# Patient Record
Sex: MALE | Race: WHITE | NOT HISPANIC OR LATINO | Employment: FULL TIME | URBAN - NONMETROPOLITAN AREA
[De-identification: names, ages, dates, MRNs, and addresses within clinical notes are randomized per-mention and may not be internally consistent; named-entity substitution may affect disease eponyms.]

---

## 2022-10-10 ENCOUNTER — OFFICE VISIT (OUTPATIENT)
Dept: CARDIOLOGY | Facility: CLINIC | Age: 62
End: 2022-10-10

## 2022-10-10 VITALS
DIASTOLIC BLOOD PRESSURE: 72 MMHG | SYSTOLIC BLOOD PRESSURE: 122 MMHG | WEIGHT: 216 LBS | BODY MASS INDEX: 30.92 KG/M2 | HEART RATE: 60 BPM | HEIGHT: 70 IN

## 2022-10-10 DIAGNOSIS — G47.00 FREQUENT NOCTURNAL AWAKENING: ICD-10-CM

## 2022-10-10 DIAGNOSIS — I10 PRIMARY HYPERTENSION: ICD-10-CM

## 2022-10-10 DIAGNOSIS — K21.9 GASTROESOPHAGEAL REFLUX DISEASE, UNSPECIFIED WHETHER ESOPHAGITIS PRESENT: ICD-10-CM

## 2022-10-10 DIAGNOSIS — R53.83 OTHER FATIGUE: ICD-10-CM

## 2022-10-10 DIAGNOSIS — E66.2 CLASS 1 OBESITY WITH ALVEOLAR HYPOVENTILATION AND BODY MASS INDEX (BMI) OF 31.0 TO 31.9 IN ADULT, UNSPECIFIED WHETHER SERIOUS COMORBIDITY PRESENT: ICD-10-CM

## 2022-10-10 DIAGNOSIS — R07.89 OTHER CHEST PAIN: Primary | ICD-10-CM

## 2022-10-10 DIAGNOSIS — R06.02 SHORTNESS OF BREATH: ICD-10-CM

## 2022-10-10 DIAGNOSIS — R35.1 NOCTURIA: ICD-10-CM

## 2022-10-10 PROCEDURE — 99204 OFFICE O/P NEW MOD 45 MIN: CPT | Performed by: NURSE PRACTITIONER

## 2022-10-10 PROCEDURE — 93000 ELECTROCARDIOGRAM COMPLETE: CPT | Performed by: NURSE PRACTITIONER

## 2022-10-10 RX ORDER — ZINC GLUCONATE 50 MG
1 TABLET ORAL DAILY
COMMUNITY

## 2022-10-10 RX ORDER — LOSARTAN POTASSIUM 100 MG/1
100 TABLET ORAL DAILY
COMMUNITY

## 2022-10-10 RX ORDER — OMEPRAZOLE 20 MG/1
20 CAPSULE, DELAYED RELEASE ORAL DAILY
COMMUNITY

## 2022-10-10 RX ORDER — MULTIVIT-MIN/IRON/FOLIC ACID/K 18-600-40
2000 CAPSULE ORAL DAILY
COMMUNITY

## 2022-10-10 RX ORDER — NIFEDIPINE 30 MG/1
30 TABLET, EXTENDED RELEASE ORAL DAILY
COMMUNITY

## 2022-10-10 NOTE — PROGRESS NOTES
Subjective   Jigar Cochran is a 62 y.o. male seen in the office today for initial cardiac evaluation.  About 3 years ago he was experiencing chest discomfort and was found to have hypertension.  He has been treated with medication since that time and done quite well.  Past medical history also includes: GERD, currently controlled with omeprazole and mild COPD.  He has a 20-year smoking history with cessation in . His past medical history is negative for significant headaches, seizures or tremors, thyroid disorder, diabetes, kidney or  disorders, GI, or prior surgical intervention.  Current symptoms include becoming easily short of breath with mild exertion, fatigue and daytime sleepiness, mild swelling in feet and ankles, and mild chest discomfort.  Family history includes mother treated for hypertension.  Father  secondary to emphysema, but was told he had also suffered heart attack in the past.      Chief Complaint   Patient presents with   • Establish Care     Referred per PCP for chest pain on exertion   • Chest Pain     Has chest pain and SOA with exertion .   • Leg Swelling     Has swelling in ankles and feet . Is worse at the end of the day   • LABS     Had labs 2022 . Results in referral packet . Has never had cardiac testing or saw Cardiology before   • Sleep Apnea     Has difficulty staying asleep at night , he does fall asleep easily when he gets still.  He  has never had any testing for sleep apnea .       Initial cardiac evaluation; shortness of breath, chest discomfort, fatigue.        Cardiac History  History reviewed. No pertinent surgical history.    Current Outpatient Medications   Medication Sig Dispense Refill   • ASPIRIN-CAFFEINE PO Take  by mouth Daily.     • Ibuprofen (ADVIL PO) Take 1 tablet by mouth As Needed.     • losartan (COZAAR) 100 MG tablet Take 1 tablet by mouth Daily.     • NIFEdipine XL (PROCARDIA XL) 30 MG 24 hr tablet Take 1 tablet by mouth Daily.     • omeprazole  (priLOSEC) 20 MG capsule Take 1 capsule by mouth Daily.     • Vitamin D, Cholecalciferol, 50 MCG (2000 UT) capsule Take 1 capsule by mouth Daily.     • Zinc 50 MG tablet Take 1 tablet by mouth Daily.       No current facility-administered medications for this visit.       Patient has no known allergies.    Past Medical History:   Diagnosis Date   • GERD (gastroesophageal reflux disease)    • Hyperlipidemia    • Hypertension    • Sleep apnea     Not sure but Dr suspects       Social History     Socioeconomic History   • Marital status: Single   Tobacco Use   • Smoking status: Former     Packs/day: 1.00     Years: 20.00     Pack years: 20.00     Types: Cigarettes     Start date: 1974     Quit date: 1995     Years since quittin.7   • Smokeless tobacco: Never   Vaping Use   • Vaping Use: Never used   Substance and Sexual Activity   • Alcohol use: Yes     Alcohol/week: 20.0 standard drinks     Types: 20 Cans of beer per week   • Drug use: Never   • Sexual activity: Yes     Partners: Female     Birth control/protection: None       Family History   Problem Relation Age of Onset   • Cancer Mother    • Heart attack Father    • Emphysema Father    • Breast cancer Sister    • Hypertension Maternal Grandmother        Review of Systems   Constitutional: Positive for fatigue. Negative for appetite change, diaphoresis and fever.   HENT: Negative.    Eyes: Negative for photophobia and visual disturbance.   Respiratory: Positive for shortness of breath.         Admits to snoring loudly at night.  Has difficulty maintaining sleep during the night   Cardiovascular: Positive for chest pain and leg swelling. Negative for palpitations.   Gastrointestinal: Negative.    Endocrine: Negative for polydipsia, polyphagia and polyuria.   Genitourinary: Negative.    Musculoskeletal: Negative for gait problem and myalgias.   Skin: Negative.    Allergic/Immunologic: Negative for immunocompromised state.   Neurological: Negative.   "  Hematological: Negative.    Psychiatric/Behavioral: Negative.        BP Readings from Last 5 Encounters:   10/10/22 122/72       Wt Readings from Last 5 Encounters:   10/10/22 98 kg (216 lb)          Objective      Labs 7/25/2022: WBC 6.54, RBC 4.68, hemoglobin 13.6, hematocrit 45.7, platelets 210, glucose 119, BUN 17, creatinine 1.08, GFR greater than 60, sodium 139, potassium 4.3, chloride 104, calcium 9.5, total protein 7.5, albumin 4.7, total bili 0.6, AST 26, ALT 45, ALP 35, A1c 5.6, total cholesterol 177, triglycerides 150, HDL 58, LDL 81, TSH 2.38, vitamin D 33.9, PSA 0.9    /72 (BP Location: Right arm, Patient Position: Sitting)   Pulse 60   Ht 177.8 cm (70\")   Wt 98 kg (216 lb)   BMI 30.99 kg/m²     Physical Exam  Vitals and nursing note reviewed.   Constitutional:       Appearance: Normal appearance.   HENT:      Head: Normocephalic.      Nose: Nose normal.      Mouth/Throat:      Pharynx: Oropharynx is clear.   Eyes:      Conjunctiva/sclera: Conjunctivae normal.   Neck:      Thyroid: No thyromegaly.      Vascular: No carotid bruit or JVD.   Cardiovascular:      Rate and Rhythm: Normal rate and regular rhythm.      Pulses:           Radial pulses are 2+ on the right side and 2+ on the left side.      Comments: Loud S2  Pulmonary:      Effort: Pulmonary effort is normal.      Breath sounds: Normal breath sounds.   Abdominal:      General: There is no distension.      Comments: At times has epigastric area tenderness   Musculoskeletal:      Cervical back: Neck supple.      Right lower leg: Edema present.      Left lower leg: Edema present.   Skin:     General: Skin is warm and dry.      Coloration: Skin is not jaundiced or pale.   Neurological:      Mental Status: He is alert and oriented to person, place, and time.   Psychiatric:         Mood and Affect: Mood normal.         Behavior: Behavior normal.           ECG 12 Lead    Date/Time: 10/10/2022 10:53 AM  Performed by: Luana Ziegler " DG  Authorized by: Luana Ziegler APRN   Previous ECG: no previous ECG available  Rhythm: sinus rhythm  Rate: normal  BPM: 60  QRS axis: normal              Assessment & Plan     Diagnoses and all orders for this visit:    1. Other chest pain (Primary)  -     Stress Test With Myocardial Perfusion One Day; Future  -     Adult Transthoracic Echo Complete W/ Cont if Necessary Per Protocol; Future    2. Shortness of breath  -     Stress Test With Myocardial Perfusion One Day; Future  -     Adult Transthoracic Echo Complete W/ Cont if Necessary Per Protocol; Future    3. Primary hypertension  -     Stress Test With Myocardial Perfusion One Day; Future  -     Adult Transthoracic Echo Complete W/ Cont if Necessary Per Protocol; Future  -     Overnight Sleep Oximetry Study; Future    4. Gastroesophageal reflux disease, unspecified whether esophagitis present    5. Class 1 obesity with alveolar hypoventilation and body mass index (BMI) of 31.0 to 31.9 in adult, unspecified whether serious comorbidity present (HCC)    6. Other fatigue  -     Stress Test With Myocardial Perfusion One Day; Future  -     Adult Transthoracic Echo Complete W/ Cont if Necessary Per Protocol; Future  -     Overnight Sleep Oximetry Study; Future    7. Nocturia  -     Overnight Sleep Oximetry Study; Future    8. Frequent nocturnal awakening  -     Overnight Sleep Oximetry Study; Future    Other orders  -     ECG 12 Lead      Patient presents today with some concern over cardiac status given symptoms of shortness of breath, fatigue, chest discomfort.  He has risk factors including previous smoking history, overweight, and hypertension.  Most recent labs shows lipids in normal range.  However, he recently stopped statin therapy secondary to side effects.  He has had no prior cardiac work-up.  Recommend proceeding with nuclear treadmill stress test to look for ischemic cause of symptoms as well as heart rate and blood pressure response to exertion.   Second heart sound is loud auscultation.  An echocardiogram ordered to look at overall cardiac status including output, valvular structure, PA pressure.  He also has symptoms suggestive of sleep apnea.  An overnight oxygen monitor ordered.  If desaturations significant then would advise sleep study.  An informational handout on sleep apnea provided.    His EKG today shows normal sinus rhythm and blood pressure is normal.  Agree with continuing current antihypertensive agents.  Informational handout on exercise and DASH diet provided.    Further recommendations based on test results.  A 6-month follow-up visit scheduled.  Please call sooner for cardiac concerns.

## 2022-10-12 DIAGNOSIS — G47.34 NOCTURNAL OXYGEN DESATURATION: ICD-10-CM

## 2022-10-12 DIAGNOSIS — G47.00 FREQUENT NOCTURNAL AWAKENING: Primary | ICD-10-CM

## 2022-10-27 ENCOUNTER — HOSPITAL ENCOUNTER (OUTPATIENT)
Dept: CARDIOLOGY | Facility: HOSPITAL | Age: 62
Discharge: HOME | End: 2022-10-27

## 2022-10-27 DIAGNOSIS — I10 PRIMARY HYPERTENSION: ICD-10-CM

## 2022-10-27 DIAGNOSIS — R07.89 OTHER CHEST PAIN: ICD-10-CM

## 2022-10-27 DIAGNOSIS — R53.83 OTHER FATIGUE: ICD-10-CM

## 2022-10-27 DIAGNOSIS — R06.02 SHORTNESS OF BREATH: ICD-10-CM

## 2022-10-27 PROCEDURE — A9500 TC99M SESTAMIBI: HCPCS | Performed by: INTERNAL MEDICINE

## 2022-10-27 PROCEDURE — 78452 HT MUSCLE IMAGE SPECT MULT: CPT | Performed by: INTERNAL MEDICINE

## 2022-10-27 PROCEDURE — 93018 CV STRESS TEST I&R ONLY: CPT | Performed by: INTERNAL MEDICINE

## 2022-10-27 PROCEDURE — 25010000002 REGADENOSON 0.4 MG/5ML SOLUTION: Performed by: NURSE PRACTITIONER

## 2022-10-27 PROCEDURE — 78452 HT MUSCLE IMAGE SPECT MULT: CPT

## 2022-10-27 PROCEDURE — 93306 TTE W/DOPPLER COMPLETE: CPT

## 2022-10-27 PROCEDURE — 0 TECHNETIUM SESTAMIBI: Performed by: INTERNAL MEDICINE

## 2022-10-27 PROCEDURE — 93306 TTE W/DOPPLER COMPLETE: CPT | Performed by: INTERNAL MEDICINE

## 2022-10-27 PROCEDURE — 93017 CV STRESS TEST TRACING ONLY: CPT

## 2022-10-27 RX ADMIN — TECHNETIUM TC 99M SESTAMIBI 1 DOSE: 1 INJECTION INTRAVENOUS at 15:28

## 2022-10-27 RX ADMIN — REGADENOSON 0.4 MG: 0.08 INJECTION, SOLUTION INTRAVENOUS at 13:22

## 2022-10-27 RX ADMIN — TECHNETIUM TC 99M SESTAMIBI 1 DOSE: 1 INJECTION INTRAVENOUS at 10:12

## 2022-10-28 LAB
AORTIC DIMENSIONLESS INDEX: 0.9 (DI)
BH CV ECHO MEAS - ACS: 1.93 CM
BH CV ECHO MEAS - AO MAX PG: 7.8 MMHG
BH CV ECHO MEAS - AO MEAN PG: 3.7 MMHG
BH CV ECHO MEAS - AO ROOT DIAM: 3 CM
BH CV ECHO MEAS - AO V2 MAX: 140.1 CM/SEC
BH CV ECHO MEAS - AO V2 VTI: 30.5 CM
BH CV ECHO MEAS - AVA(I,D): 3.1 CM2
BH CV ECHO MEAS - EDV(CUBED): 89.3 ML
BH CV ECHO MEAS - EDV(MOD-SP4): 85.1 ML
BH CV ECHO MEAS - EF(MOD-SP4): 69.2 %
BH CV ECHO MEAS - EF_3D-VOL: 67 %
BH CV ECHO MEAS - ESV(CUBED): 17.7 ML
BH CV ECHO MEAS - ESV(MOD-SP4): 26.2 ML
BH CV ECHO MEAS - FS: 41.7 %
BH CV ECHO MEAS - IVS/LVPW: 0.99 CM
BH CV ECHO MEAS - IVSD: 1.29 CM
BH CV ECHO MEAS - LA A2CS (ATRIAL LENGTH): 5.4 CM
BH CV ECHO MEAS - LA A4C LENGTH: 5.6 CM
BH CV ECHO MEAS - LA DIMENSION: 4.4 CM
BH CV ECHO MEAS - LAT PEAK E' VEL: 6.6 CM/SEC
BH CV ECHO MEAS - LV DIASTOLIC VOL/BSA (35-75): 39.5 CM2
BH CV ECHO MEAS - LV MASS(C)D: 220.1 GRAMS
BH CV ECHO MEAS - LV MAX PG: 6.4 MMHG
BH CV ECHO MEAS - LV MEAN PG: 3.1 MMHG
BH CV ECHO MEAS - LV SYSTOLIC VOL/BSA (12-30): 12.1 CM2
BH CV ECHO MEAS - LV V1 MAX: 126.2 CM/SEC
BH CV ECHO MEAS - LV V1 VTI: 26.4 CM
BH CV ECHO MEAS - LVIDD: 4.5 CM
BH CV ECHO MEAS - LVIDS: 2.6 CM
BH CV ECHO MEAS - LVOT AREA: 3.5 CM2
BH CV ECHO MEAS - LVOT DIAM: 2.13 CM
BH CV ECHO MEAS - LVPWD: 1.31 CM
BH CV ECHO MEAS - MED PEAK E' VEL: 4.8 CM/SEC
BH CV ECHO MEAS - MR MAX PG: 39.2 MMHG
BH CV ECHO MEAS - MR MAX VEL: 313 CM/SEC
BH CV ECHO MEAS - MV A MAX VEL: 49.4 CM/SEC
BH CV ECHO MEAS - MV DEC SLOPE: 287.8 CM/SEC2
BH CV ECHO MEAS - MV DEC TIME: 0.2 MSEC
BH CV ECHO MEAS - MV E MAX VEL: 73.2 CM/SEC
BH CV ECHO MEAS - MV E/A: 1.48
BH CV ECHO MEAS - MV MAX PG: 2.6 MMHG
BH CV ECHO MEAS - MV MEAN PG: 1.01 MMHG
BH CV ECHO MEAS - MV P1/2T: 79.6 MSEC
BH CV ECHO MEAS - MV V2 VTI: 33.7 CM
BH CV ECHO MEAS - MVA(P1/2T): 2.8 CM2
BH CV ECHO MEAS - MVA(VTI): 2.8 CM2
BH CV ECHO MEAS - PA V2 MAX: 87.9 CM/SEC
BH CV ECHO MEAS - RAP SYSTOLE: 10 MMHG
BH CV ECHO MEAS - RV MAX PG: 3.2 MMHG
BH CV ECHO MEAS - RV V1 MAX: 90 CM/SEC
BH CV ECHO MEAS - RV V1 VTI: 22.3 CM
BH CV ECHO MEAS - RVDD: 3 CM
BH CV ECHO MEAS - RVSP: 22.6 MMHG
BH CV ECHO MEAS - SI(MOD-SP4): 27.3 ML/M2
BH CV ECHO MEAS - SV(LVOT): 93.8 ML
BH CV ECHO MEAS - SV(MOD-SP4): 58.9 ML
BH CV ECHO MEAS - TR MAX PG: 12.6 MMHG
BH CV ECHO MEAS - TR MAX VEL: 177.7 CM/SEC
BH CV ECHO MEASUREMENTS AVERAGE E/E' RATIO: 12.84
BH CV REST NUCLEAR ISOTOPE DOSE: 10.4 MCI
BH CV STRESS COMMENTS STAGE 1: NORMAL
BH CV STRESS DOSE REGADENOSON STAGE 1: 0.4
BH CV STRESS DURATION MIN STAGE 1: 0
BH CV STRESS DURATION SEC STAGE 1: 10
BH CV STRESS NUCLEAR ISOTOPE DOSE: 30.1 MCI
BH CV STRESS PROTOCOL 1: NORMAL
BH CV STRESS RECOVERY BP: NORMAL MMHG
BH CV STRESS RECOVERY HR: 68 BPM
BH CV STRESS STAGE 1: 1
LEFT ATRIUM VOLUME INDEX: 16.5 ML/M2
LEFT ATRIUM VOLUME: 35.6 ML
LV EF NUC BP: 76 %
MAXIMAL PREDICTED HEART RATE: 157 BPM
MAXIMAL PREDICTED HEART RATE: 157 BPM
PERCENT MAX PREDICTED HR: 49.68 %
SINUS: 3.3 CM
STRESS BASELINE BP: NORMAL MMHG
STRESS BASELINE HR: 58 BPM
STRESS PERCENT HR: 58 %
STRESS POST PEAK BP: NORMAL MMHG
STRESS POST PEAK HR: 78 BPM
STRESS TARGET HR: 133 BPM
STRESS TARGET HR: 133 BPM

## 2022-11-03 ENCOUNTER — TELEPHONE (OUTPATIENT)
Dept: CARDIOLOGY | Facility: CLINIC | Age: 62
End: 2022-11-03

## 2022-11-03 NOTE — TELEPHONE ENCOUNTER
Caller: DR. DILLON OFFICE    Relationship: Provider    Best call back number: [phone]    What is the best time to reach you: ANY    Who are you requesting to speak with (clinical staff, provider,  specific staff member): ANY      What was the call regarding: DR. DILLON OFFICE WAS INQUIRING ABOUT [facility] WAS REFERRED TO THEM BY US AND THEY ARE MISSING HIS OVERNIGHT LOGS, IF POSSIBLE, PLEASE FAX @ [phone]    Do you require a callback: IF NEEDED

## 2023-04-24 ENCOUNTER — OFFICE VISIT (OUTPATIENT)
Dept: CARDIOLOGY | Facility: CLINIC | Age: 63
End: 2023-04-24
Payer: COMMERCIAL

## 2023-04-24 VITALS
SYSTOLIC BLOOD PRESSURE: 112 MMHG | HEART RATE: 68 BPM | BODY MASS INDEX: 30.15 KG/M2 | DIASTOLIC BLOOD PRESSURE: 70 MMHG | WEIGHT: 210.6 LBS | HEIGHT: 70 IN

## 2023-04-24 DIAGNOSIS — E88.81 METABOLIC SYNDROME: ICD-10-CM

## 2023-04-24 DIAGNOSIS — E78.00 HYPERCHOLESTEREMIA: ICD-10-CM

## 2023-04-24 DIAGNOSIS — G47.30 SLEEP APNEA IN ADULT: ICD-10-CM

## 2023-04-24 DIAGNOSIS — I10 PRIMARY HYPERTENSION: Primary | ICD-10-CM

## 2023-04-24 DIAGNOSIS — R94.39 ABNORMAL NUCLEAR STRESS TEST: ICD-10-CM

## 2023-04-24 PROCEDURE — 99214 OFFICE O/P EST MOD 30 MIN: CPT | Performed by: INTERNAL MEDICINE

## 2023-04-24 RX ORDER — PRAVASTATIN SODIUM 10 MG
10 TABLET ORAL DAILY
COMMUNITY

## 2023-04-24 RX ORDER — ASPIRIN 81 MG/1
81 TABLET ORAL DAILY
Qty: 30 TABLET | Refills: 6 | Status: SHIPPED | OUTPATIENT
Start: 2023-04-24

## 2023-04-24 RX ORDER — BIOTIN 10 MG
TABLET ORAL
COMMUNITY

## 2023-04-24 NOTE — PROGRESS NOTES
Chief Complaint   Patient presents with   • Follow-up     Cardiac management   • Lab     Last labs a month ago per PCP, he reports labs good other than HDL little low.   • Medication     Reports since restarting Pravastatin, he is having joint pain.    • Shortness of Breath     Same as always, no worsening. He feels related to history of smoking and weight. He reports following with Dr Ashton, was unable to tolerate CPAP.   • Med Refill     PCP writes refills on medications.       CARDIAC COMPLAINTS  dyspnea and fatigue        Subjective   Jigar Cochran is a 62 y.o. male came in today for his follow-up visit.  He has history of hypertension, borderline hypercholesterolemia who was referred for chest pain, shortness of breath and generalized weakness.  He underwent an echocardiogram which was technically limited.  His left ventricular function was normal.  He had left atrial enlargement and no significant pulm hypertension.  He also underwent the stress test in the form of Lexiscan and found to have a normal LV function.  There was some changes seen in the inferolateral wall which could be secondary to diaphragmatic attenuation versus ischemia.  It was decided to try medical management and if he has more symptoms then he may need cardiac catheterization.  He came today stating that he has not had any more chest pain.  He still has some shortness of breath.  He had nocturnal pulse PO2 measurement done which was very abnormal.  He was seen by a pulmonologist and had as diagnosis of CPAP.  He is not able to tolerate the mask.  He still feels tired and fatigued.  He also has been noticing some joint pain of the started taking Pravachol every day              Cardiac History  Past Surgical History:   Procedure Laterality Date   • CARDIOVASCULAR STRESS TEST  10/27/2022    Lexiscan- EF > 70%. R/O Inferolateral Ischemia   • ECHO - CONVERTED  10/27/2022    TLS. EF 70%. LA- 4.4. Trace-Mild MR. RVSP- 23 mmHg   • OTHER SURGICAL  HISTORY  10/11/2022    Pulse O2- Very Abnormal       Current Outpatient Medications   Medication Sig Dispense Refill   • Cyanocobalamin (VITAMIN B 12 PO) Take  by mouth Daily.     • Ibuprofen (ADVIL PO) Take 1 tablet by mouth As Needed.     • losartan (COZAAR) 100 MG tablet Take 1 tablet by mouth Daily.     • Multiple Vitamins-Minerals (Multivitamin Adult) chewable tablet Chew 4 (Four) Times a Week.     • NIFEdipine XL (PROCARDIA XL) 30 MG 24 hr tablet Take 1 tablet by mouth Daily.     • omeprazole (priLOSEC) 20 MG capsule Take 1 capsule by mouth Daily.     • pravastatin (PRAVACHOL) 10 MG tablet Take 1 tablet by mouth Daily.     • Vitamin D, Cholecalciferol, 50 MCG (2000 UT) capsule Take 1 capsule by mouth Daily.     • Zinc 50 MG tablet Take 1 tablet by mouth Daily.     • aspirin 81 MG EC tablet Take 1 tablet by mouth Daily. 30 tablet 6     No current facility-administered medications for this visit.       Allergies  :  Patient has no known allergies.       Past Medical History:   Diagnosis Date   • GERD (gastroesophageal reflux disease)    • Hyperlipidemia    • Hypertension    • Sleep apnea     Not sure but Dr suspects       Social History     Socioeconomic History   • Marital status: Single   Tobacco Use   • Smoking status: Former     Packs/day: 1.00     Years: 20.00     Pack years: 20.00     Types: Cigarettes     Start date: 1974     Quit date: 1995     Years since quittin.2   • Smokeless tobacco: Never   Vaping Use   • Vaping Use: Never used   Substance and Sexual Activity   • Alcohol use: Yes     Comment: now rarely drinks mix drink   • Drug use: Never   • Sexual activity: Yes     Partners: Female     Birth control/protection: None       Family History   Problem Relation Age of Onset   • Cancer Mother    • Heart attack Father    • Emphysema Father    • Breast cancer Sister    • Hypertension Maternal Grandmother        Review of Systems   Constitutional: Positive for malaise/fatigue. Negative for  "decreased appetite.   HENT: Negative for congestion and sore throat.    Eyes: Negative for blurred vision, double vision and visual disturbance.   Cardiovascular: Positive for dyspnea on exertion. Negative for chest pain.   Respiratory: Positive for shortness of breath and snoring.    Endocrine: Negative for cold intolerance and heat intolerance.   Hematologic/Lymphatic: Negative for adenopathy. Does not bruise/bleed easily.   Skin: Negative for itching, nail changes and skin cancer.   Musculoskeletal: Positive for myalgias. Negative for arthritis.   Gastrointestinal: Negative for abdominal pain, dysphagia and heartburn.   Genitourinary: Negative for bladder incontinence and frequency.   Neurological: Negative for dizziness, seizures and vertigo.   Psychiatric/Behavioral: Negative for altered mental status.   Allergic/Immunologic: Negative for environmental allergies and hives.       Diabetes- No  Thyroid- normal    Objective     /70 (BP Location: Right arm)   Pulse 68   Ht 177.8 cm (70\")   Wt 95.5 kg (210 lb 9.6 oz)   BMI 30.22 kg/m²     Vitals and nursing note reviewed.   Constitutional:       Appearance: Healthy appearance. Not in distress.   Eyes:      Conjunctiva/sclera: Conjunctivae normal.      Pupils: Pupils are equal, round, and reactive to light.   HENT:      Head: Normocephalic.   Pulmonary:      Effort: Pulmonary effort is normal.      Breath sounds: Normal breath sounds.   Cardiovascular:      PMI at left midclavicular line. Normal rate. Regular rhythm.   Abdominal:      General: Bowel sounds are normal.      Palpations: Abdomen is soft.   Musculoskeletal: Normal range of motion.      Cervical back: Normal range of motion and neck supple. Skin:     General: Skin is warm and dry.   Neurological:      Mental Status: Alert, oriented to person, place, and time and oriented to person, place and time.       Procedures            @ASSESSMENT/PLAN@  BMI is >= 30 and <35. (Class 1 Obesity). The " following options were offered after discussion;: exercise counseling/recommendations and nutrition counseling/recommendations     Diagnoses and all orders for this visit:    1. Primary hypertension (Primary)    2. Hypercholesteremia    3. Metabolic syndrome    4. Sleep apnea in adult    5. Abnormal nuclear stress test  -     aspirin 81 MG EC tablet; Take 1 tablet by mouth Daily.  Dispense: 30 tablet; Refill: 6       At baseline his heart rate is stable.  His blood pressure is normal.  His BMI is around 30.  His clinical examination is otherwise unremarkable.    Regarding his hypertension, it seems to be controlled with Cozaar and Procardia XL.  Continue the same for now.  He need his electrolytes and renal function checked.       Regarding his hypercholesterolemia, he is on a low-dose of Pravachol only at 10 mg.  I explained to him about trying to keep the LDL less than 70.  He is going to try taking it regularly.    Regarding the metabolic syndrome, he has lost few pounds.  He has completely quit drinking alcohol at this time.  His last alcohol intake was about a week ago.  I had a long talk with him about alcohol use as well as metabolic syndrome and sleep apnea.    Regarding the sleep apnea, I explained to him about the amount of hypoxia.  He is advised to talk with the pulmonologist.  I am not sure whether using oxygen alone may be beneficial    Regarding his abnormal stress test, I explained to him about false positive stress test.  Since he is not having any anginal symptoms and the septum appears to be well perfused will continue conservative management.  He is strongly advised to be on aspirin 81 mg once a day    Reassurance about his cardiac status was given.  I will see him back in 6 months or sooner if needed                      Electronically signed by Aden Fabian MD April 24, 2023 13:32 EDT

## 2023-04-24 NOTE — LETTER
April 24, 2023     Dorita Joshi MD  [address]  [facility] [address]    Patient: Jigar Cochran   YOB: 1960   Date of Visit: 4/24/2023       Dear Dr. Adi MD:    Thank you for referring Jigar Cochran to me for evaluation. Below are the relevant portions of my assessment and plan of care.    If you have questions, please do not hesitate to call me. I look forward to following Jigar along with you.         Sincerely,        Aden Fabian MD        CC: No Recipients    Aden Fabian MD  04/24/23 1338  Sign when Signing Visit  Chief Complaint   Patient presents with   • Follow-up     Cardiac management   • Lab     Last labs a month ago per PCP, he reports labs good other than HDL little low.   • Medication     Reports since restarting Pravastatin, he is having joint pain.    • Shortness of Breath     Same as always, no worsening. He feels related to history of smoking and weight. He reports following with Dr Ashton, was unable to tolerate CPAP.   • Med Refill     PCP writes refills on medications.       CARDIAC COMPLAINTS  dyspnea and fatigue       Subjective   Jigar Cochran is a 62 y.o. male came in today for his follow-up visit.  He has history of hypertension, borderline hypercholesterolemia who was referred for chest pain, shortness of breath and generalized weakness.  He underwent an echocardiogram which was technically limited.  His left ventricular function was normal.  He had left atrial enlargement and no significant pulm hypertension.  He also underwent the stress test in the form of Lexiscan and found to have a normal LV function.  There was some changes seen in the inferolateral wall which could be secondary to diaphragmatic attenuation versus ischemia.  It was decided to try medical management and if he has more symptoms then he may need cardiac catheterization.  He came today stating that he has not had any more chest pain.  He still has some shortness of breath.  He had  nocturnal pulse PO2 measurement done which was very abnormal.  He was seen by a pulmonologist and had as diagnosis of CPAP.  He is not able to tolerate the mask.  He still feels tired and fatigued.  He also has been noticing some joint pain of the started taking Pravachol every day              Cardiac History  Past Surgical History:   Procedure Laterality Date   • CARDIOVASCULAR STRESS TEST  10/27/2022    Lexiscan- EF > 70%. R/O Inferolateral Ischemia   • ECHO - CONVERTED  10/27/2022    TLS. EF 70%. LA- 4.4. Trace-Mild MR. RVSP- 23 mmHg   • OTHER SURGICAL HISTORY  10/11/2022    Pulse O2- Very Abnormal       Current Outpatient Medications   Medication Sig Dispense Refill   • Cyanocobalamin (VITAMIN B 12 PO) Take  by mouth Daily.     • Ibuprofen (ADVIL PO) Take 1 tablet by mouth As Needed.     • losartan (COZAAR) 100 MG tablet Take 1 tablet by mouth Daily.     • Multiple Vitamins-Minerals (Multivitamin Adult) chewable tablet Chew 4 (Four) Times a Week.     • NIFEdipine XL (PROCARDIA XL) 30 MG 24 hr tablet Take 1 tablet by mouth Daily.     • omeprazole (priLOSEC) 20 MG capsule Take 1 capsule by mouth Daily.     • pravastatin (PRAVACHOL) 10 MG tablet Take 1 tablet by mouth Daily.     • Vitamin D, Cholecalciferol, 50 MCG (2000 UT) capsule Take 1 capsule by mouth Daily.     • Zinc 50 MG tablet Take 1 tablet by mouth Daily.     • aspirin 81 MG EC tablet Take 1 tablet by mouth Daily. 30 tablet 6     No current facility-administered medications for this visit.       Allergies  :  Patient has no known allergies.       Past Medical History:   Diagnosis Date   • GERD (gastroesophageal reflux disease)    • Hyperlipidemia    • Hypertension    • Sleep apnea     Not sure but  suspects       Social History     Socioeconomic History   • Marital status: Single   Tobacco Use   • Smoking status: Former     Packs/day: 1.00     Years: 20.00     Pack years: 20.00     Types: Cigarettes     Start date: 1/1/1974     Quit date: 1/23/1995     " Years since quittin.2   • Smokeless tobacco: Never   Vaping Use   • Vaping Use: Never used   Substance and Sexual Activity   • Alcohol use: Yes     Comment: now rarely drinks mix drink   • Drug use: Never   • Sexual activity: Yes     Partners: Female     Birth control/protection: None       Family History   Problem Relation Age of Onset   • Cancer Mother    • Heart attack Father    • Emphysema Father    • Breast cancer Sister    • Hypertension Maternal Grandmother        Review of Systems   Constitutional: Positive for malaise/fatigue. Negative for decreased appetite.   HENT: Negative for congestion and sore throat.    Eyes: Negative for blurred vision, double vision and visual disturbance.   Cardiovascular: Positive for dyspnea on exertion. Negative for chest pain.   Respiratory: Positive for shortness of breath and snoring.    Endocrine: Negative for cold intolerance and heat intolerance.   Hematologic/Lymphatic: Negative for adenopathy. Does not bruise/bleed easily.   Skin: Negative for itching, nail changes and skin cancer.   Musculoskeletal: Positive for myalgias. Negative for arthritis.   Gastrointestinal: Negative for abdominal pain, dysphagia and heartburn.   Genitourinary: Negative for bladder incontinence and frequency.   Neurological: Negative for dizziness, seizures and vertigo.   Psychiatric/Behavioral: Negative for altered mental status.   Allergic/Immunologic: Negative for environmental allergies and hives.       Diabetes- No  Thyroid- normal    Objective     /70 (BP Location: Right arm)   Pulse 68   Ht 177.8 cm (70\")   Wt 95.5 kg (210 lb 9.6 oz)   BMI 30.22 kg/m²     Vitals and nursing note reviewed.   Constitutional:       Appearance: Healthy appearance. Not in distress.   Eyes:      Conjunctiva/sclera: Conjunctivae normal.      Pupils: Pupils are equal, round, and reactive to light.   HENT:      Head: Normocephalic.   Pulmonary:      Effort: Pulmonary effort is normal.      Breath " sounds: Normal breath sounds.   Cardiovascular:      PMI at left midclavicular line. Normal rate. Regular rhythm.   Abdominal:      General: Bowel sounds are normal.      Palpations: Abdomen is soft.   Musculoskeletal: Normal range of motion.      Cervical back: Normal range of motion and neck supple. Skin:     General: Skin is warm and dry.   Neurological:      Mental Status: Alert, oriented to person, place, and time and oriented to person, place and time.       Procedures           @ASSESSMENT/PLAN@  BMI is >= 30 and <35. (Class 1 Obesity). The following options were offered after discussion;: exercise counseling/recommendations and nutrition counseling/recommendations     Diagnoses and all orders for this visit:    1. Primary hypertension (Primary)    2. Hypercholesteremia    3. Metabolic syndrome    4. Sleep apnea in adult    5. Abnormal nuclear stress test  -     aspirin 81 MG EC tablet; Take 1 tablet by mouth Daily.  Dispense: 30 tablet; Refill: 6       At baseline his heart rate is stable.  His blood pressure is normal.  His BMI is around 30.  His clinical examination is otherwise unremarkable.    Regarding his hypertension, it seems to be controlled with Cozaar and Procardia XL.  Continue the same for now.  He need his electrolytes and renal function checked.       Regarding his hypercholesterolemia, he is on a low-dose of Pravachol only at 10 mg.  I explained to him about trying to keep the LDL less than 70.  He is going to try taking it regularly.    Regarding the metabolic syndrome, he has lost few pounds.  He has completely quit drinking alcohol at this time.  His last alcohol intake was about a week ago.  I had a long talk with him about alcohol use as well as metabolic syndrome and sleep apnea.    Regarding the sleep apnea, I explained to him about the amount of hypoxia.  He is advised to talk with the pulmonologist.  I am not sure whether using oxygen alone may be beneficial    Regarding his abnormal  stress test, I explained to him about false positive stress test.  Since he is not having any anginal symptoms and the septum appears to be well perfused will continue conservative management.  He is strongly advised to be on aspirin 81 mg once a day    Reassurance about his cardiac status was given.  I will see him back in 6 months or sooner if needed                     Electronically signed by Aden Fabian MD April 24, 2023 13:32 EDT